# Patient Record
(demographics unavailable — no encounter records)

---

## 2025-02-03 NOTE — HISTORY OF PRESENT ILLNESS
[de-identified] : Cough, fever, congestion and tugging on ears since Thursday. X1 instance of vomiting. Pulling on right ear.  [FreeTextEntry6] : Fever to 102 x 2 days Cough and nasal congestion 4 days pulling R ear Denies chest pain or SOB appetite decreased, + fluids Normal UOP Vomiting once 2 nights ago, none since, Diarrhea x 1 today No known covid contacts

## 2025-02-03 NOTE — DISCUSSION/SUMMARY
[FreeTextEntry1] : Discussed positive flu results and possible complications of flu to be aware of Discussed risks/benefits of Tamiflu - parent wants to defer Discussed covid positive test results.   Discussed isolation and quarantine recommendations.   Discussed worrisome symptoms of covid to monitor for - recurrence of high fever/cp/sob/severe abdominal pains - if occurs to ER. Symptomatic treatment Maintain adequate hydration  Cool mist humidifier Saline nose drops and bulb suctioning as needed Stressed handwashing and infection control  Pay close observation for new or worsening symptoms Instructed to return to office if symptoms worsen/persist or fevers persist Go to ER or UC if condition worsens or unable to to get to the office or after office hours

## 2025-02-03 NOTE — PHYSICAL EXAM
[NL] : warm, clear [Wheezing] : no wheezing [Rales] : no rales [Tachypnea] : no tachypnea [Rhonchi] : no rhonchi

## 2025-02-07 NOTE — HISTORY OF PRESENT ILLNESS
[de-identified] : Flu A pos 2/3, still febrile  [FreeTextEntry6] : had the flu and covid positive on Monday had been having fevrers since thursday of last weke so today is day 7 of fever tmax 103 seems better today bad cough  no vomiting/diarrhea but pako this week had vomiting from cough x 2 times before, no distress

## 2025-02-07 NOTE — PHYSICAL EXAM
[TextEntry] : General: awake, alert, cooperative, no acute distress, interactive  Head: no signs injury Eyes: EOMI, PERRL, no discharge, no conjunctival or scleral erythema  Ears: tympanic membranes clear bilaterally without erythema or purulent effusion, normal light reflex Nose: + rhinnorhea Mouth: mucosa moist and pink, slight erythema to the oropharynx, no vesicles, exudates, lesions or soft palate petechiae Neck: supple, good range of motion Lungs: clear to auscultation bilaterally  Cardiac: normal S1 S2, regular rate and rhythm Abdomen: soft, non tender, non distended Lymphatics: shotty cervical lymphadenopathy, no pre or post auricular lymphadenopathy, no occipital lymphadenopathy Skin: no rash

## 2025-02-07 NOTE — HISTORY OF PRESENT ILLNESS
[de-identified] : Flu A pos 2/3, still febrile  [FreeTextEntry6] : had the flu and covid positive on Monday had been having fevrers since thursday of last weke so today is day 7 of fever tmax 103 seems better today bad cough  no vomiting/diarrhea but pako this week had vomiting from cough x 2 times before, no distress

## 2025-02-07 NOTE — PLAN
[TextEntry] : recommend tylenol or motrin as discussed as needed for fever or discomfort. hydration very important to go to the ER if signs of respiratory distress such as increased respiratory rate, pain with respiration, lethargy, inability to PO, vomiting or concerning signs of dehydration or worsening clinical status as discussed follow up as discussed not to be in public until at least 24 hours after fever and symptom free    addendum CXR negative mom aware addendum 2/7/25 no fever yet this morning tmax 99.8 feeling better, cough improved mom will watch for fever and if fever or any persistent or worsening symptoms will follow back up